# Patient Record
Sex: MALE | Race: WHITE | NOT HISPANIC OR LATINO | Employment: OTHER | URBAN - METROPOLITAN AREA
[De-identification: names, ages, dates, MRNs, and addresses within clinical notes are randomized per-mention and may not be internally consistent; named-entity substitution may affect disease eponyms.]

---

## 2018-08-12 ENCOUNTER — HOSPITAL ENCOUNTER (EMERGENCY)
Facility: HOSPITAL | Age: 58
Discharge: HOME | End: 2018-08-12
Attending: EMERGENCY MEDICINE | Admitting: EMERGENCY MEDICINE
Payer: MEDICARE

## 2018-08-12 VITALS
DIASTOLIC BLOOD PRESSURE: 57 MMHG | SYSTOLIC BLOOD PRESSURE: 106 MMHG | OXYGEN SATURATION: 93 % | WEIGHT: 194 LBS | HEART RATE: 84 BPM | RESPIRATION RATE: 16 BRPM | TEMPERATURE: 98.1 F

## 2018-08-12 DIAGNOSIS — F25.0 SCHIZOAFFECTIVE DISORDER, BIPOLAR TYPE (HCC): ICD-10-CM

## 2018-08-12 DIAGNOSIS — G25.9 MOVEMENT DISORDER: Primary | ICD-10-CM

## 2018-08-12 LAB
AMMONIA PLAS-SCNC: 11 UMOL/L (ref 11–35)
AMPHETAMINES SERPL QL SCN: NEGATIVE
ANION GAP SERPL CALCULATED.3IONS-SCNC: 9 MMOL/L (ref 4–13)
BARBITURATES UR QL: NEGATIVE
BASOPHILS # BLD AUTO: 0.09 THOUSANDS/ΜL (ref 0–0.1)
BASOPHILS NFR BLD AUTO: 1 % (ref 0–1)
BENZODIAZ UR QL: NEGATIVE
BUN SERPL-MCNC: 11 MG/DL (ref 5–25)
CALCIUM SERPL-MCNC: 9.5 MG/DL (ref 8.3–10.1)
CHLORIDE SERPL-SCNC: 106 MMOL/L (ref 100–108)
CLARITY, POC: CLEAR
CO2 SERPL-SCNC: 26 MMOL/L (ref 21–32)
COCAINE UR QL: NEGATIVE
COLOR, POC: YELLOW
CREAT SERPL-MCNC: 1.24 MG/DL (ref 0.6–1.3)
EOSINOPHIL # BLD AUTO: 0.14 THOUSAND/ΜL (ref 0–0.61)
EOSINOPHIL NFR BLD AUTO: 2 % (ref 0–6)
ERYTHROCYTE [DISTWIDTH] IN BLOOD BY AUTOMATED COUNT: 13.3 % (ref 11.6–15.1)
EXT BILIRUBIN, UA: ABNORMAL
EXT BLOOD URINE: ABNORMAL
EXT GLUCOSE, UA: ABNORMAL
EXT KETONES: ABNORMAL
EXT NITRITE, UA: ABNORMAL
EXT PH, UA: 6
EXT PROTEIN, UA: ABNORMAL
EXT SPECIFIC GRAVITY, UA: 1
EXT UROBILINOGEN: 0.2
GFR SERPL CREATININE-BSD FRML MDRD: 64 ML/MIN/1.73SQ M
GLUCOSE SERPL-MCNC: 151 MG/DL (ref 65–140)
HCT VFR BLD AUTO: 40.8 % (ref 36.5–49.3)
HGB BLD-MCNC: 13.8 G/DL (ref 12–17)
IMM GRANULOCYTES # BLD AUTO: 0.05 THOUSAND/UL (ref 0–0.2)
IMM GRANULOCYTES NFR BLD AUTO: 1 % (ref 0–2)
LYMPHOCYTES # BLD AUTO: 1.22 THOUSANDS/ΜL (ref 0.6–4.47)
LYMPHOCYTES NFR BLD AUTO: 13 % (ref 14–44)
MCH RBC QN AUTO: 31 PG (ref 26.8–34.3)
MCHC RBC AUTO-ENTMCNC: 33.8 G/DL (ref 31.4–37.4)
MCV RBC AUTO: 92 FL (ref 82–98)
METHADONE UR QL: NEGATIVE
MONOCYTES # BLD AUTO: 0.6 THOUSAND/ΜL (ref 0.17–1.22)
MONOCYTES NFR BLD AUTO: 6 % (ref 4–12)
NEUTROPHILS # BLD AUTO: 7.54 THOUSANDS/ΜL (ref 1.85–7.62)
NEUTS SEG NFR BLD AUTO: 77 % (ref 43–75)
NRBC BLD AUTO-RTO: 0 /100 WBCS
OPIATES UR QL SCN: NEGATIVE
PCP UR QL: NEGATIVE
PLATELET # BLD AUTO: 349 THOUSANDS/UL (ref 149–390)
PMV BLD AUTO: 8.8 FL (ref 8.9–12.7)
POTASSIUM SERPL-SCNC: 3.5 MMOL/L (ref 3.5–5.3)
RBC # BLD AUTO: 4.45 MILLION/UL (ref 3.88–5.62)
SODIUM SERPL-SCNC: 141 MMOL/L (ref 136–145)
THC UR QL: NEGATIVE
WBC # BLD AUTO: 9.64 THOUSAND/UL (ref 4.31–10.16)
WBC # BLD EST: ABNORMAL 10*3/UL

## 2018-08-12 PROCEDURE — 81002 URINALYSIS NONAUTO W/O SCOPE: CPT | Performed by: EMERGENCY MEDICINE

## 2018-08-12 PROCEDURE — 80307 DRUG TEST PRSMV CHEM ANLYZR: CPT | Performed by: EMERGENCY MEDICINE

## 2018-08-12 PROCEDURE — 99283 EMERGENCY DEPT VISIT LOW MDM: CPT

## 2018-08-12 PROCEDURE — 80048 BASIC METABOLIC PNL TOTAL CA: CPT | Performed by: EMERGENCY MEDICINE

## 2018-08-12 PROCEDURE — 85025 COMPLETE CBC W/AUTO DIFF WBC: CPT | Performed by: EMERGENCY MEDICINE

## 2018-08-12 PROCEDURE — 82140 ASSAY OF AMMONIA: CPT | Performed by: EMERGENCY MEDICINE

## 2018-08-12 PROCEDURE — 36415 COLL VENOUS BLD VENIPUNCTURE: CPT | Performed by: EMERGENCY MEDICINE

## 2018-08-12 RX ORDER — LAMOTRIGINE 150 MG/1
150 TABLET ORAL DAILY
COMMUNITY

## 2018-08-12 RX ORDER — OLANZAPINE 20 MG/1
20 TABLET ORAL
COMMUNITY

## 2018-08-12 RX ORDER — LANOLIN ALCOHOL/MO/W.PET/CERES
3 CREAM (GRAM) TOPICAL
COMMUNITY

## 2018-08-12 RX ORDER — ATROPINE SULFATE 10 MG/ML
SOLUTION/ DROPS OPHTHALMIC
COMMUNITY
Start: 2018-05-10

## 2018-08-12 RX ORDER — LANOLIN ALCOHOL/MO/W.PET/CERES
100 CREAM (GRAM) TOPICAL 3 TIMES DAILY
COMMUNITY
Start: 2017-08-02

## 2018-08-12 RX ORDER — HYDROXYZINE HYDROCHLORIDE 25 MG/1
TABLET, FILM COATED ORAL
COMMUNITY
Start: 2018-04-05

## 2018-08-13 NOTE — DISCHARGE INSTRUCTIONS
Parkinson Disease   WHAT YOU NEED TO KNOW:   Parkinson disease (PD) is a long-term movement disorder  The brain cells that control movement start to die and cause changes in how you move, feel, and act  Even though PD may progress and have a severe impact on your daily life, it is not a life-threatening disease  DISCHARGE INSTRUCTIONS:   Return to the emergency department if:   · You feel like hurting or killing yourself or others  · You feel lightheaded, dizzy, or faint  · You have chest pain or shortness of breath  · You have weakness in an arm or leg  · You become confused, or you have difficulty speaking  · You have dizziness, a severe headache, or vision changes  Contact your healthcare provider or neurologist if:   · You have a fever  · You are not sleeping well or you sleep more than usual     · You cannot eat or are eating more than usual     · You feel that your condition is getting worse  · You have new symptoms since your last appointment  · Your sad feelings or thoughts change the way you function during the day  · You have questions or concerns about your condition or care  Medicines:   · Anti-Parkinson medicines  are used to improve movement problems, such as muscle stiffness, twitches, and restlessness  Your healthcare provider may use several types of this medicine to help manage your symptoms  · Take your medicine as directed  Contact your healthcare provider if you think your medicine is not helping or if you have side effects  Tell him of her if you are allergic to any medicine  Keep a list of the medicines, vitamins, and herbs you take  Include the amounts, and when and why you take them  Bring the list or the pill bottles to follow-up visits  Carry your medicine list with you in case of an emergency  Follow up with your healthcare provider or neurologist as directed:  Write down your questions so you remember to ask them during your visits    Manage your PD:   · Do not eat foods that are high in protein or dairy  They can cause problems with how some of your medicine works  Ask your healthcare provider how much protein and dairy is safe to eat  He may tell you to eat foods high in fiber to make it easier to have a bowel movement  Examples are cereals, beans, vegetables, and whole-grain breads  Ask if you need to be on a special diet  · Do not drive  unless your healthcare provider says it is okay  · Exercise regularly  A physical therapist teaches you exercises to help improve movement and strength, and to decrease pain  This may help you control your body movements, and keep your balance  · Go to occupational therapy  An occupational therapist teaches you skills to help with your daily activities  Your occupational therapist may help you choose equipment to help you at home and work  He can also suggest ways to keep your home and workplace safe  · Go to speech therapy  A speech therapist may work with you to help you improve your ability to talk or swallow  · Go to counseling  A mental health counselor can help you talk about your feeling about PD  Your family may attend meetings to learn new ways to take better care of both you and themselves  © 2017 [facility] Information is for End User's use only and may not be sold, redistributed or otherwise used for commercial purposes  All illustrations and images included in CareNotes® are the copyrighted property of [employer]  or Vern Ferris  The above information is an  only  It is not intended as medical advice for individual conditions or treatments  Talk to your doctor, nurse or pharmacist before following any medical regimen to see if it is safe and effective for you

## 2018-08-13 NOTE — ED PROVIDER NOTES
History  Chief Complaint   Patient presents with    Shaking     States this has happened before; medications changed last week  This 56-year-old man with history of schizoaffective disorder, bipolar type, memory loss and parkinsonian syndrome complains of increase in tremor over the last week  This suddenly became much worse tonight  He also notes difficulty chewing his food  At times he has to stop and moved the food around his mouth with his finger before he can finish chewing swallowing it  He has had workup with his neurologist, Dr Abbi Urias ([phone])  Recently tapered off clozaril and started olanzipine  Has also been on Ingrezza for a while  Patient denies headache, change in vision, chest pain, palpitations, fever and GI symptoms  Prior to Admission Medications   Prescriptions Last Dose Informant Patient Reported? Taking? OLANZapine (ZyPREXA) 20 MG tablet   Yes Yes   Sig: Take 20 mg by mouth daily at bedtime     Pediatric Multivitamins-Iron (POLY-VI-SOL/IRON PO)   Yes Yes   Sig: Take 1 tablet by mouth daily     Valbenazine Tosylate 40 MG CAPS   Yes Yes   Sig: Take 80 mg by mouth daily     atropine (ISOPTO ATROPINE) 1 % ophthalmic solution   Yes Yes   Sig: Place two drops under the tongue every six hours  Swish and spit as needed for increased saliva  hydrOXYzine HCL (ATARAX) 25 mg tablet   Yes Yes   Sig: One every 6 hours as needed for   lamoTRIgine (LaMICtal) 150 MG tablet   Yes Yes   Sig: Take 150 mg by mouth daily     melatonin 3 mg   Yes Yes   Sig: Take 3 mg by mouth daily at bedtime     thiamine 100 MG tablet   Yes Yes   Sig: Take 100 mg by mouth 3 (three) times a day      Facility-Administered Medications: None       Past Medical History:   Diagnosis Date    Psychiatric disorder     schizoaffective disorder       Past Surgical History:   Procedure Laterality Date    ULNAR NERVE REPAIR Right        History reviewed  No pertinent family history    I have reviewed and agree with the history as documented  Social History   Substance Use Topics    Smoking status: Never Smoker    Smokeless tobacco: Never Used    Alcohol use No        Review of Systems   Constitutional: Negative  HENT: Negative  Eyes: Negative  Respiratory: Negative  Cardiovascular: Negative  Gastrointestinal: Negative  Endocrine: Negative  Genitourinary: Negative  Musculoskeletal: Negative  Skin: Negative  Allergic/Immunologic: Negative  Neurological: Positive for tremors and speech difficulty  Negative for dizziness, seizures, facial asymmetry and headaches  Hematological: Negative  Psychiatric/Behavioral: Positive for confusion, dysphoric mood and hallucinations  Negative for self-injury  The patient is nervous/anxious  All other systems reviewed and are negative  Physical Exam  Physical Exam   Constitutional: He is oriented to person, place, and time  He appears well-developed and well-nourished  No distress  HENT:   Head: Normocephalic and atraumatic  Right Ear: External ear normal    Left Ear: External ear normal    Mouth/Throat: Oropharynx is clear and moist    Eyes: Conjunctivae and EOM are normal  Pupils are equal, round, and reactive to light  Neck: Normal range of motion  Neck supple  No JVD present  Cardiovascular: Normal rate, regular rhythm, normal heart sounds and intact distal pulses  No murmur heard  Pulmonary/Chest: Effort normal and breath sounds normal    Abdominal: Soft  Bowel sounds are normal  He exhibits no mass  There is no tenderness  There is no rebound and no guarding  Musculoskeletal: Normal range of motion  He exhibits no edema or tenderness  Lymphadenopathy:     He has no cervical adenopathy  Neurological: He is alert and oriented to person, place, and time  He has normal reflexes  No cranial nerve deficit or sensory deficit  He exhibits abnormal muscle tone   Coordination normal    There is resting tremor that is worse with purposeful movement  Skin: Skin is warm and dry  No rash noted  He is not diaphoretic  Psychiatric: He has a normal mood and affect  His behavior is normal    Nursing note and vitals reviewed  Vital Signs  ED Triage Vitals   Temperature Pulse Respirations Blood Pressure SpO2   08/12/18 2028 08/12/18 2028 08/12/18 2028 08/12/18 2028 08/12/18 2028   98 1 °F (36 7 °C) (!) 112 16 165/76 96 %      Temp Source Heart Rate Source Patient Position - Orthostatic VS BP Location FiO2 (%)   08/12/18 2028 08/12/18 2028 08/12/18 2158 08/12/18 2158 --   Temporal Monitor Lying Right arm       Pain Score       08/12/18 2028       No Pain           Vitals:    08/12/18 2028 08/12/18 2158   BP: 165/76 127/60   Pulse: (!) 112 81   Patient Position - Orthostatic VS:  Lying       Visual Acuity  Visual Acuity      Most Recent Value   L Pupil Size (mm)  2   R Pupil Size (mm)  2          ED Medications  Medications - No data to display    Diagnostic Studies  Results Reviewed     Procedure Component Value Units Date/Time    Ammonia [[id number]]  (Normal) Collected:  08/12/18 2116    Lab Status:  Final result Specimen:  Blood from Arm, Right Updated:  08/12/18 2139     Ammonia 11 umol/L     Basic metabolic panel [[id number]]  (Abnormal) Collected:  08/12/18 2116    Lab Status:  Final result Specimen:  Blood from Arm, Right Updated:  08/12/18 2137     Sodium 141 mmol/L      Potassium 3 5 mmol/L      Chloride 106 mmol/L      CO2 26 mmol/L      Anion Gap 9 mmol/L      BUN 11 mg/dL      Creatinine 1 24 mg/dL      Glucose 151 (H) mg/dL      Calcium 9 5 mg/dL      eGFR 64 ml/min/1 73sq m     Narrative:         National Kidney Disease Education Program recommendations are as follows:  GFR calculation is accurate only with a steady state creatinine  Chronic Kidney disease less than 60 ml/min/1 73 sq  meters  Kidney failure less than 15 ml/min/1 73 sq  meters      Rapid drug screen, urine [[id number]]  (Normal) Collected:  08/12/18 2116 Lab Status:  Final result Specimen:  Urine from Urine, Clean Catch Updated:  08/12/18 2136     Amph/Meth UR Negative     Barbiturate Ur Negative     Benzodiazepine Urine Negative     Cocaine Urine Negative     Methadone Urine Negative     Opiate Urine Negative     PCP Ur Negative     THC Urine Negative    Narrative:         FOR MEDICAL PURPOSES ONLY  IF CONFIRMATION NEEDED PLEASE CONTACT THE LAB WITHIN 5 DAYS      Drug Screen Cutoff Levels:  AMPHETAMINE/METHAMPHETAMINES  1000 ng/mL  BARBITURATES     200 ng/mL  BENZODIAZEPINES     200 ng/mL  COCAINE      300 ng/mL  METHADONE      300 ng/mL  OPIATES      300 ng/mL  PHENCYCLIDINE     25 ng/mL  THC       50 ng/mL    CBC and differential [[id number]]  (Abnormal) Collected:  08/12/18 2116    Lab Status:  Final result Specimen:  Blood from Arm, Right Updated:  08/12/18 2127     WBC 9 64 Thousand/uL      RBC 4 45 Million/uL      Hemoglobin 13 8 g/dL      Hematocrit 40 8 %      MCV 92 fL      MCH 31 0 pg      MCHC 33 8 g/dL      RDW 13 3 %      MPV 8 8 (L) fL      Platelets 581 Thousands/uL      nRBC 0 /100 WBCs      Neutrophils Relative 77 (H) %      Immat GRANS % 1 %      Lymphocytes Relative 13 (L) %      Monocytes Relative 6 %      Eosinophils Relative 2 %      Basophils Relative 1 %      Neutrophils Absolute 7 54 Thousands/µL      Immature Grans Absolute 0 05 Thousand/uL      Lymphocytes Absolute 1 22 Thousands/µL      Monocytes Absolute 0 60 Thousand/µL      Eosinophils Absolute 0 14 Thousand/µL      Basophils Absolute 0 09 Thousands/µL     POCT urinalysis dipstick [[id number]]  (Abnormal) Resulted:  08/12/18 2116    Lab Status:  Final result Specimen:  Urine Updated:  08/12/18 2116     Color, UA yellow     Clarity, UA clear     EXT Glucose, UA (Ref: Negative) neg     EXT Bilirubin, UA (Ref: Negative) neg     EXT Ketones, UA (Ref: Negative) neg     EXT Spec Grav, UA 1 005     EXT Blood, UA (Ref: Negative) neg     EXT pH, UA 6 0     EXT Protein, UA (Ref: Negative) neg EXT Urobilinogen, UA (Ref: 0 2- 1 0) 0 2     EXT Leukocytes, UA (Ref: Negative) small     EXT Nitrite, UA (Ref: Negative) neg                 No orders to display              Procedures  Procedures       Phone Contacts  ED Phone Contact    ED Course  ED Course as of Aug 12 2205   Bernard Michelle Aug 12, 2018   2153   Patient feels improved  At rest he has no tremor  I instructed him to follow up with his neurologist in the morning  His mother is in the room  Both are in agreement  MDM  Number of Diagnoses or Management Options  Movement disorder: established and worsening  Schizoaffective disorder, bipolar type [facility]):      Amount and/or Complexity of Data Reviewed  Clinical lab tests: ordered and reviewed  Obtain history from someone other than the patient: yes  Review and summarize past medical records: yes      CritCare Time    Disposition  Final diagnoses: Movement disorder   Schizoaffective disorder, bipolar type ([facility] [address] )     Time reflects when diagnosis was documented in both MDM as applicable and the Disposition within this note     Time User Action Codes Description Comment    8/12/2018 10:03 PM Mc Cornelius Add [G25 9] Movement disorder     8/12/2018 10:03 PM Geignacioa Forestte Add [F25 0] Schizoaffective disorder, bipolar type [facility])       ED Disposition     ED Disposition Condition Comment    Discharge  Chhaya Hatch discharge to home/self care  Condition at discharge: Stable        Follow-up Information     Follow up With Specialties Details Why [address] Neurology Call in 1 day  tell her about your symptoms  Ask if you need a medication adjustment  [address]  [place] [address] [id number] [phone]            Patient's Medications   Discharge Prescriptions    No medications on file     No discharge procedures on file      ED Provider  Electronically Signed by           Aria Beltran DO  08/12/18 9014

## 2018-08-14 ENCOUNTER — TRANSCRIBE ORDERS (OUTPATIENT)
Dept: ADMINISTRATIVE | Facility: HOSPITAL | Age: 58
End: 2018-08-14

## 2018-08-14 DIAGNOSIS — R47.02 DYSPHASIA: Primary | ICD-10-CM

## 2018-08-15 ENCOUNTER — APPOINTMENT (OUTPATIENT)
Dept: PREADMISSION TESTING | Facility: HOSPITAL | Age: 58
End: 2018-08-15
Payer: MEDICARE

## 2018-08-15 DIAGNOSIS — Z01.818 PREOP TESTING: Primary | ICD-10-CM

## 2018-08-15 LAB
APTT PPP: 25 SECONDS (ref 23–34)
BASOPHILS # BLD AUTO: 0 THOUSANDS/ΜL (ref 0–0.1)
BASOPHILS NFR BLD AUTO: 1 % (ref 0–1)
EOSINOPHIL # BLD AUTO: 0.2 THOUSAND/ΜL (ref 0–0.4)
EOSINOPHIL NFR BLD AUTO: 3 % (ref 0–6)
ERYTHROCYTE [DISTWIDTH] IN BLOOD BY AUTOMATED COUNT: 13.7 %
HCT VFR BLD AUTO: 41.6 % (ref 41–53)
HGB BLD-MCNC: 14.3 G/DL (ref 13.5–17.5)
INR PPP: 1.01 (ref 0.89–1.1)
LYMPHOCYTES # BLD AUTO: 1.8 THOUSANDS/ΜL (ref 0.5–4)
LYMPHOCYTES NFR BLD AUTO: 27 % (ref 20–50)
MCH RBC QN AUTO: 32.2 PG (ref 26–34)
MCHC RBC AUTO-ENTMCNC: 34.3 G/DL (ref 31–36)
MCV RBC AUTO: 94 FL (ref 80–100)
MONOCYTES # BLD AUTO: 0.6 THOUSAND/ΜL (ref 0.2–0.9)
MONOCYTES NFR BLD AUTO: 8 % (ref 1–10)
NEUTROPHILS # BLD AUTO: 4.1 THOUSANDS/ΜL (ref 1.8–7.8)
NEUTS SEG NFR BLD AUTO: 62 % (ref 45–65)
PLATELET # BLD AUTO: 345 THOUSANDS/UL (ref 150–450)
PMV BLD AUTO: 7.8 FL (ref 8.9–12.7)
PROTHROMBIN TIME: 10.7 SECONDS (ref 9.5–11.6)
RBC # BLD AUTO: 4.43 MILLION/UL (ref 4.5–5.9)
WBC # BLD AUTO: 6.6 THOUSAND/UL (ref 4.5–11)

## 2018-08-15 PROCEDURE — 85610 PROTHROMBIN TIME: CPT

## 2018-08-15 PROCEDURE — 85025 COMPLETE CBC W/AUTO DIFF WBC: CPT

## 2018-08-15 PROCEDURE — 85730 THROMBOPLASTIN TIME PARTIAL: CPT

## 2018-08-15 NOTE — PRE-PROCEDURE INSTRUCTIONS
Pre-op Showering Instructions for Surgery Patients    Before your operation, you play an important role in decreasing your risk for infection by washing with special antiseptic soap  This is an effective way to reduce bacteria on the skin which may help to prevent infections at the surgical site  Please read the following directions in advance  1  In the week before your operation, purchase a 4 ounce bottle of antiseptic soap containing chlorhexidine gluconate (CHG)  4%  Some brand names include: Aplicare®, Endure, and Hibiclens®  The cost is usually less than $5 00   For your convenience, the [employer] carries the soap   It may also be available at your doctors office or pre-admission testing center, and at most retail pharmacies   If you are allergic or sensitive to soaps containing CHG, please let your doctor know so another antiseptic can be suggested   CHG antiseptic soap is for external use only  2  The day before your operation, follow these instructions carefully to get ready   Please clean linens (sheets) on your bed; you should sleep on clean sheets after your evening shower   Get clean towels and washcloth ready - you need enough for 2 showers   Set aside clean underwear, pajamas, and clothing to wear after the showers     Reminders:   DO NOT use any other soap or body rinse on your skin during or after the antiseptic showers   DO NOT use lotion, powder, deodorant, or perfume/aftershave of any kind on your skin after your antiseptic shower   DO NOT shave any body parts in the 24 hours/day before your operation   DO NOT get the antiseptic soap in your eyes, ears, nose, mouth, or vaginal area    3  You will need to shower the night before AND the morning of your surgery  Shower 1:   The first evening before the operation, take the first shower   First, shampoo your hair with regular shampoo and rinse it completely before you use the antiseptic soap   After washing and rinsing your hair, rinse your body   Next, use a clean washcloth to apply the antiseptic soap and wash your body from the neck down to your toes using ½ bottle of the antiseptic soap  You will use the other ½ bottle for the second shower   Clean the area where your incision will be; lather this area well for about 2 minutes   If you are having head or neck surgery, wash areas with the antiseptic soap   Rinse yourself completely with running water   Use a clean towel to dry off   Wear clean underwear and clothing/pajamas  Shower 2   The morning of your operation, take the second shower following the same steps as Shower 1 using the second ½ of the bottle of antiseptic soap   Use clean cloths and towels to wash and dry yourself   Wear clean underwear and clothing    Pre-Surgery Instructions:   Medication Instructions    hydrOXYzine HCL (ATARAX) 25 mg tablet Instructed patient per Anesthesia Guidelines   lamoTRIgine (LaMICtal) 150 MG tablet Instructed patient per Anesthesia Guidelines

## 2018-08-16 ENCOUNTER — ANESTHESIA EVENT (OUTPATIENT)
Dept: PERIOP | Facility: HOSPITAL | Age: 58
End: 2018-08-16
Payer: MEDICARE

## 2018-08-16 NOTE — ANESTHESIA PREPROCEDURE EVALUATION
Review of Systems/Medical History  Patient summary reviewed  Chart reviewed  No history of anesthetic complications     Cardiovascular  Negative cardio ROS Exercise tolerance (METS): >4,     Pulmonary  Negative pulmonary ROS Not a smoker , Sleep apnea CPAP,        GI/Hepatic  Negative GI/hepatic ROS          Negative  ROS        Endo/Other  Negative endo/other ROS      GYN       Hematology  Negative hematology ROS      Musculoskeletal       Neurology    Neuromuscular disease (Parkinson's Disease) ,    Psychology   Psychiatric history (Bipolar), Anxiety (no acute change), Schizophrenia (asymptomatic at present  Pt's mom present)             Physical Exam    Airway    Mallampati score: III  TM Distance: >3 FB  Neck ROM: limited     Dental       Cardiovascular  Comment: Negative ROS, Cardiovascular exam normal    Pulmonary  Pulmonary exam normal     Other Findings  Likely difficult intubationFixed and  Upper and lower      Anesthesia Plan  ASA Score- 3     Anesthesia Type- IV sedation with anesthesia with ASA Monitors  Additional Monitors:   Airway Plan:         Plan Factors-Patient not instructed to abstain from smoking on day of procedure  Patient did not smoke on day of surgery  Induction- intravenous  Postoperative Plan- Plan for postoperative opioid use  Informed Consent- Anesthetic plan and risks discussed with patient and mother  I personally reviewed this patient with the CRNA  Discussed and agreed on the Anesthesia Plan with the CRNA  Milton Diaz

## 2018-08-17 ENCOUNTER — HOSPITAL ENCOUNTER (OUTPATIENT)
Facility: HOSPITAL | Age: 58
Setting detail: OUTPATIENT SURGERY
Discharge: HOME | End: 2018-08-17
Attending: PLASTIC SURGERY | Admitting: PLASTIC SURGERY
Payer: MEDICARE

## 2018-08-17 ENCOUNTER — ANESTHESIA (OUTPATIENT)
Dept: PERIOP | Facility: HOSPITAL | Age: 58
End: 2018-08-17
Payer: MEDICARE

## 2018-08-17 VITALS
SYSTOLIC BLOOD PRESSURE: 125 MMHG | HEART RATE: 63 BPM | OXYGEN SATURATION: 96 % | WEIGHT: 194 LBS | HEIGHT: 69 IN | RESPIRATION RATE: 12 BRPM | TEMPERATURE: 97.2 F | DIASTOLIC BLOOD PRESSURE: 77 MMHG | BODY MASS INDEX: 28.73 KG/M2

## 2018-08-17 DIAGNOSIS — Z01.818 PREOP TESTING: ICD-10-CM

## 2018-08-17 DIAGNOSIS — G56.22 CUBITAL TUNNEL SYNDROME, LEFT: Primary | ICD-10-CM

## 2018-08-17 RX ORDER — BUPIVACAINE HYDROCHLORIDE 2.5 MG/ML
INJECTION, SOLUTION INFILTRATION; PERINEURAL AS NEEDED
Status: DISCONTINUED | OUTPATIENT
Start: 2018-08-17 | End: 2018-08-17 | Stop reason: HOSPADM

## 2018-08-17 RX ORDER — MELATONIN
2000 DAILY
COMMUNITY

## 2018-08-17 RX ORDER — FENTANYL CITRATE 50 UG/ML
INJECTION, SOLUTION INTRAMUSCULAR; INTRAVENOUS AS NEEDED
Status: DISCONTINUED | OUTPATIENT
Start: 2018-08-17 | End: 2018-08-17 | Stop reason: SURG

## 2018-08-17 RX ORDER — OXYCODONE AND ACETAMINOPHEN 2.5; 325 MG/1; MG/1
1 TABLET ORAL EVERY 4 HOURS PRN
Qty: 30 TABLET | Refills: 0 | Status: SHIPPED | OUTPATIENT
Start: 2018-08-17 | End: 2018-08-27

## 2018-08-17 RX ORDER — OXYCODONE HYDROCHLORIDE AND ACETAMINOPHEN 5; 325 MG/1; MG/1
1 TABLET ORAL EVERY 4 HOURS PRN
Status: DISCONTINUED | OUTPATIENT
Start: 2018-08-17 | End: 2018-08-20 | Stop reason: HOSPADM

## 2018-08-17 RX ORDER — FENTANYL CITRATE/PF 50 MCG/ML
25 SYRINGE (ML) INJECTION
Status: DISCONTINUED | OUTPATIENT
Start: 2018-08-17 | End: 2018-08-17 | Stop reason: HOSPADM

## 2018-08-17 RX ORDER — SODIUM CHLORIDE, SODIUM LACTATE, POTASSIUM CHLORIDE, CALCIUM CHLORIDE 600; 310; 30; 20 MG/100ML; MG/100ML; MG/100ML; MG/100ML
75 INJECTION, SOLUTION INTRAVENOUS CONTINUOUS
Status: DISCONTINUED | OUTPATIENT
Start: 2018-08-17 | End: 2018-08-20 | Stop reason: HOSPADM

## 2018-08-17 RX ORDER — MEPERIDINE HYDROCHLORIDE 25 MG/ML
12.5 INJECTION INTRAMUSCULAR; INTRAVENOUS; SUBCUTANEOUS
Status: DISCONTINUED | OUTPATIENT
Start: 2018-08-17 | End: 2018-08-17 | Stop reason: HOSPADM

## 2018-08-17 RX ORDER — PROPOFOL 10 MG/ML
INJECTION, EMULSION INTRAVENOUS AS NEEDED
Status: DISCONTINUED | OUTPATIENT
Start: 2018-08-17 | End: 2018-08-17 | Stop reason: SURG

## 2018-08-17 RX ORDER — MIDAZOLAM HYDROCHLORIDE 1 MG/ML
INJECTION INTRAMUSCULAR; INTRAVENOUS AS NEEDED
Status: DISCONTINUED | OUTPATIENT
Start: 2018-08-17 | End: 2018-08-17 | Stop reason: SURG

## 2018-08-17 RX ORDER — MAGNESIUM HYDROXIDE 1200 MG/15ML
LIQUID ORAL AS NEEDED
Status: DISCONTINUED | OUTPATIENT
Start: 2018-08-17 | End: 2018-08-17 | Stop reason: HOSPADM

## 2018-08-17 RX ORDER — PROPOFOL 10 MG/ML
INJECTION, EMULSION INTRAVENOUS CONTINUOUS PRN
Status: DISCONTINUED | OUTPATIENT
Start: 2018-08-17 | End: 2018-08-17 | Stop reason: SURG

## 2018-08-17 RX ORDER — DEXAMETHASONE SODIUM PHOSPHATE 4 MG/ML
4 INJECTION, SOLUTION INTRA-ARTICULAR; INTRALESIONAL; INTRAMUSCULAR; INTRAVENOUS; SOFT TISSUE ONCE AS NEEDED
Status: DISCONTINUED | OUTPATIENT
Start: 2018-08-17 | End: 2018-08-17 | Stop reason: HOSPADM

## 2018-08-17 RX ORDER — DIPHENHYDRAMINE HYDROCHLORIDE 50 MG/ML
12.5 INJECTION INTRAMUSCULAR; INTRAVENOUS ONCE AS NEEDED
Status: DISCONTINUED | OUTPATIENT
Start: 2018-08-17 | End: 2018-08-17 | Stop reason: HOSPADM

## 2018-08-17 RX ADMIN — FENTANYL CITRATE 50 MCG: 50 INJECTION INTRAMUSCULAR; INTRAVENOUS at 08:01

## 2018-08-17 RX ADMIN — SODIUM CHLORIDE, SODIUM LACTATE, POTASSIUM CHLORIDE, AND CALCIUM CHLORIDE 75 ML/HR: .6; .31; .03; .02 INJECTION, SOLUTION INTRAVENOUS at 07:57

## 2018-08-17 RX ADMIN — PROPOFOL 100 MCG/KG/MIN: 10 INJECTION, EMULSION INTRAVENOUS at 08:03

## 2018-08-17 RX ADMIN — MIDAZOLAM HYDROCHLORIDE 2 MG: 1 INJECTION, SOLUTION INTRAMUSCULAR; INTRAVENOUS at 08:00

## 2018-08-17 RX ADMIN — FENTANYL CITRATE 50 MCG: 50 INJECTION INTRAMUSCULAR; INTRAVENOUS at 08:19

## 2018-08-17 RX ADMIN — SODIUM CHLORIDE, SODIUM LACTATE, POTASSIUM CHLORIDE, AND CALCIUM CHLORIDE: .6; .31; .03; .02 INJECTION, SOLUTION INTRAVENOUS at 07:56

## 2018-08-17 RX ADMIN — PROPOFOL 100 MG: 10 INJECTION, EMULSION INTRAVENOUS at 08:02

## 2018-08-17 NOTE — OP NOTE
OPERATIVE REPORT  PATIENT NAME: Danielle Eubanks    :  1960  MRN: [id number]  Pt Location: [facility] OR ROOM 03    SURGERY DATE: 2018    Surgeon(s) and Role:     * Petar Whitley MD - Primary    Preop Diagnosis:  Cubital tunnel syndrome on left [G56 22]    Post-Op Diagnosis Codes:     * Cubital tunnel syndrome on left [G56 22]    Procedure(s) (LRB):  RELEASE CUBITAL TUNNEL (Left)    Specimen(s):  * No specimens in log *    Estimated Blood Loss:   Minimal    Drains:       Anesthesia Type:   IV Sedation with Anesthesia    Operative Indications:  Cubital tunnel syndrome on left [G56 22]  Numbness and tingling left hand 4th and 5th fingers positive EMG    Operative Findings:   For left cubital tunnel syndrome    Complications:   None    Procedure and Technique:  Patient was draped and prepped in normal sterile fashion given IV sedation tourniquet was inflated to 250 mm of mercury premarked area at the left elbow was incised under loupe magnification was carried down to the ulnar nerve the ulnar nerve was found proximally there was thick good tendinous bands over the cubital tunnel that were released hemostasis was achieved electrocautery wounds was irrigated with normal saline that was an hourglass appearance of the nerve underneath the tendinous bands the nerve was traced 7 cm above and below the cubital tunnel there were no constrictions of the nerve the wound was closed with 3-0 Vicryl and a running subcuticular with 3-0 Prolene suture soft dressings were applied patient tolerated the procedure well   I was present for the entire procedure    Patient Disposition:  PACU     SIGNATURE: Petar Whitley MD  DATE: 2018  TIME: 9:02 AM

## 2018-08-17 NOTE — ANESTHESIA POSTPROCEDURE EVALUATION
Post-Op Assessment Note      CV Status:  Stable    Mental Status:  Alert and awake    Hydration Status:  Euvolemic    PONV Controlled:  Controlled    Airway Patency:  Patent    Post Op Vitals Reviewed: Yes          Staff: Anesthesiologist, CRNA           /69 (08/17/18 0915)    Temp     Pulse 64 (08/17/18 0915)   Resp 14 (08/17/18 0915)    SpO2 98 % (08/17/18 0915)

## 2018-08-17 NOTE — H&P
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71-year-old male who was been over 30 years ago developed    Review of Systems   All other systems reviewed and are negative  Historical Information   Past Medical History:   Diagnosis Date    Anxiety     CPAP (continuous positive airway pressure) dependence     Parkinson's disease ([facility] [address] )     early signs but not officially diagnosed   Psychiatric disorder     schizoaffective disorder    Schizo-affective schizophrenia ([facility] [address] )     Schizoaffective disorder ([facility] [address] )     Sleep apnea     Tremor of right hand      Past Surgical History:   Procedure Laterality Date    HAND SURGERY      right cubitlal tunnel repair    HERNIA REPAIR      ULNAR NERVE REPAIR Right      Social History   History   Alcohol Use No     History   Drug Use No     History   Smoking Status    Never Smoker   Smokeless Tobacco    Never Used     Family History: non-contributory    Meds/Allergies   all medications and allergies reviewed  Allergies   Allergen Reactions    Oxcarbazepine Rash    Prunus Persica      Other reaction(s):  Other (See Comments)  fresh peaches, lip swelling       Objective   First Vitals:   Blood Pressure: 109/61 (08/17/18 0621)  Pulse: 69 (08/17/18 0621)  Temperature: (!) 97 3 °F (36 3 °C) (08/17/18 0621)  Temp Source: Temporal (08/17/18 0741)  Respirations: 18 (08/17/18 0621)  Height: 5' 9" (175 3 cm) (08/15/18 1203)  Weight - Scale: 88 kg (194 lb) (08/15/18 1203)  SpO2: 96 % (08/17/18 0621)    Current Vitals:   Blood Pressure: 109/61 (08/17/18 0621)  Pulse: 69 (08/17/18 0621)  Temperature: (!) 97 3 °F (36 3 °C) (08/17/18 0621)  Temp Source: Temporal (08/17/18 6194)  Respirations: 18 (08/17/18 3389)  Height: 5' 9" (175 3 cm) (08/17/18 3599)  Weight - Scale: 88 kg (194 lb) (08/17/18 0627)  SpO2: 96 % (08/17/18 0621)    No intake or output data in the 24 hours ending 08/17/18 0730    Invasive Devices          No matching active lines, drains, or airways          Physical Exam examination of the head ears eyes nose and throat is within normal limits heart sounds S1-S2 heard no murmurs gallops lungs clear abdomen is soft extremities are as above    Lab Results:   Imaging:   EKG, Pathology, and Other Studies:     Code Status: No Order  Advance Directive and Living Will:      Power of :    POLST:      Counseling / Coordination of Care:   None

## 2018-08-17 NOTE — DISCHARGE INSTRUCTIONS
Cubital Tunnel Syndrome   WHAT YOU NEED TO KNOW:   Cubital tunnel syndrome is a condition where there is increased pressure on the ulnar nerve in your elbow  The ulnar nerve controls muscles and feeling in the hand  Cubital tunnel syndrome may be caused by direct pressure, stretching, or decreased blood flow to the ulnar nerve  DISCHARGE INSTRUCTIONS:   Medicines:   · NSAIDs:  These medicines decrease swelling and pain  NSAIDs are available without a doctor's order  Ask your healthcare provider which medicine is right for you and how much to take  Take as directed  NSAIDs can cause stomach bleeding or kidney problems if not taken correctly  · Take your medicine as directed  Contact your healthcare provider if you think your medicine is not helping or if you have side effects  Tell him or her if you are allergic to any medicine  Keep a list of the medicines, vitamins, and herbs you take  Include the amounts, and when and why you take them  Bring the list or the pill bottles to follow-up visits  Carry your medicine list with you in case of an emergency  Follow up with your healthcare provider as directed:  Write down your questions so you remember to ask them during your visits  Manage your symptoms:   · Avoid putting pressure on your elbow:  Certain positions put pressure on the ulnar nerve in your elbow  Leaning or sleeping on your bent elbow can make your symptoms worse  · Apply ice:  Ice helps decrease swelling and pain  Ice may also help prevent tissue damage  Use an ice pack or put crushed ice in a plastic bag  Cover the ice pack with a towel and place it on the area for 15 to 20 minutes every hour  · Rest your arm:  You may need to rest your injured arm and avoid activities that cause your symptoms to allow your nerve to heal     · Get physical therapy:  A physical therapist can show you exercises to help improve movement and strength   Physical therapy can also help decrease pain and loss of function  · Use elbow splint or brace: You may need a brace or splint on your elbow to decrease your arm movement  This will help to keep pressure off your ulnar nerve  You may also need elbow pads to protect your elbow  Contact your healthcare provider if:   · Your symptoms get worse  · Your hand and fingers are so weak that you cannot grab, squeeze, or lift items  · You have questions or concerns about your condition or care  Seek care immediately or call 911 if:  · You suddenly lose feeling in your hand or fingers  · You cannot move your ring or little finger  © 2017 [employer]  Information is for End User's use only and may not be sold, redistributed or otherwise used for commercial purposes  All illustrations and images included in CareNotes® are the copyrighted property of [employer]  or [employer]  The above information is an  only  It is not intended as medical advice for individual conditions or treatments  Talk to your doctor, nurse or pharmacist before following any medical regimen to see if it is safe and effective for you  Cubital Tunnel Syndrome   WHAT YOU NEED TO KNOW:   Cubital tunnel syndrome is a condition where there is increased pressure on the ulnar nerve in your elbow  The ulnar nerve controls muscles and feeling in the hand  Cubital tunnel syndrome may be caused by direct pressure, stretching, or decreased blood flow to the ulnar nerve  DISCHARGE INSTRUCTIONS:   Medicines:   · NSAIDs:  These medicines decrease swelling and pain  NSAIDs are available without a doctor's order  Ask which medicine is right for you and how much to take  Take as directed  NSAIDs can cause stomach bleeding or kidney problems if not taken correctly  · Take your medicine as directed  Contact your healthcare provider if you think your medicine is not helping or if you have side effects  Tell him of her if you are allergic to any medicine   Keep a list of the medicines, vitamins, and herbs you take  Include the amounts, and when and why you take them  Bring the list or the pill bottles to follow-up visits  Carry your medicine list with you in case of an emergency  Follow up with your healthcare provider as directed:  Write down your questions so you remember to ask them during your visits  Manage your symptoms:   · Avoid putting pressure on your elbow:  Certain positions put pressure on the ulnar nerve in your elbow  Leaning or sleeping on your bent elbow can make your symptoms worse  · Apply ice:  Ice helps decrease swelling and pain  Ice may also help prevent tissue damage  Use an ice pack or put crushed ice in a plastic bag  Cover the ice pack with a towel and place it on the area for 15 to 20 minutes every hour  · Rest your arm:  You may need to rest your injured arm and avoid activities that cause your symptoms to allow your nerve to heal     · Get physical therapy:  A physical therapist can show you exercises to help improve movement and strength  Physical therapy can also help decrease pain and loss of function  · Use elbow splint or brace: You may need a brace or splint on your elbow to decrease your arm movement  This will help to keep pressure off your ulnar nerve  You may also need elbow pads to protect your elbow  Contact your healthcare provider if:   · Your symptoms get worse  · Your hand and fingers are so weak that you cannot grab, squeeze, or lift items  · You have questions or concerns about your condition or care  Return to the emergency department if:   · You suddenly lose feeling in your hand or fingers  · You cannot move your ring or little finger  © 2017 [employer]  Information is for End User's use only and may not be sold, redistributed or otherwise used for commercial purposes   All illustrations and images included in CareNotes® are the copyrighted property of [employer]  or [employer]  The above information is an  only  It is not intended as medical advice for individual conditions or treatments  Talk to your doctor, nurse or pharmacist before following any medical regimen to see if it is safe and effective for you

## 2018-08-17 NOTE — NURSING NOTE
Pt returned to [facility] awake alert, IV infusing  C/O slight discomfort in surgical area  Dsg to left arm is dry and intact  Left arm and hand are pink and warm + radial pulse  + movement of fingers left hand  Some numbness of the 4th and 5th fingers left hand  Taking po

## (undated) DEVICE — NEEDLE BLUNT 18 G X 1 1/2IN

## (undated) DEVICE — SUT VICRYL 3-0 PS-2 18 IN J497G

## (undated) DEVICE — SKIN MARKER DUAL TIP WITH RULER CAP, FLEXIBLE RULER AND LABELS: Brand: DEVON

## (undated) DEVICE — STERILE POLYISOPRENE POWDER-FREE SURGICAL GLOVES WITH EMOLLIENT COATING: Brand: PROTEXIS

## (undated) DEVICE — KERLIX BANDAGE ROLL: Brand: KERLIX

## (undated) DEVICE — CUFF TOURNIQUET DISP SZ18

## (undated) DEVICE — TUBING SUCTION 5MM X 12 FT

## (undated) DEVICE — NEEDLE 25G X 1 1/2

## (undated) DEVICE — STOCKINETTE 2P PREROLLD 6X60

## (undated) DEVICE — OCCLUSIVE GAUZE STRIP,3% BISMUTH TRIBROMOPHENATE IN PETROLATUM BLEND: Brand: XEROFORM

## (undated) DEVICE — CABLE BIPOLAR DISP MEGADYNE

## (undated) DEVICE — SUPER SPONGES,MEDIUM: Brand: DERMACEA

## (undated) DEVICE — STERILE POLYISOPRENE POWDER-FREE SURGICAL GLOVES: Brand: PROTEXIS

## (undated) DEVICE — INTENDED FOR TISSUE SEPARATION, AND OTHER PROCEDURES THAT REQUIRE A SHARP SURGICAL BLADE TO PUNCTURE OR CUT.: Brand: BARD-PARKER SAFETY BLADES SIZE 15, STERILE

## (undated) DEVICE — SPONGE LAP 18 X 4 IN

## (undated) DEVICE — UNDERGLOVE PROTEXIS  BLUE SZ 7

## (undated) DEVICE — SUT ETHILON 4-0 PS-2 18 IN 1667H

## (undated) DEVICE — ACE WRAP 4 IN STERILE

## (undated) DEVICE — READY WET SKIN SCRUB TRAY-LF: Brand: MEDLINE INDUSTRIES, INC.

## (undated) DEVICE — 4-PORT MANIFOLD: Brand: NEPTUNE 2